# Patient Record
Sex: FEMALE | Race: WHITE | NOT HISPANIC OR LATINO | Employment: FULL TIME | URBAN - METROPOLITAN AREA
[De-identification: names, ages, dates, MRNs, and addresses within clinical notes are randomized per-mention and may not be internally consistent; named-entity substitution may affect disease eponyms.]

---

## 2020-07-30 ENCOUNTER — OFFICE VISIT (OUTPATIENT)
Dept: OBSTETRICS AND GYNECOLOGY | Facility: CLINIC | Age: 55
End: 2020-07-30
Payer: COMMERCIAL

## 2020-07-30 ENCOUNTER — TELEPHONE (OUTPATIENT)
Dept: OBSTETRICS AND GYNECOLOGY | Facility: CLINIC | Age: 55
End: 2020-07-30

## 2020-07-30 VITALS
BODY MASS INDEX: 28.25 KG/M2 | DIASTOLIC BLOOD PRESSURE: 84 MMHG | HEIGHT: 67 IN | WEIGHT: 180 LBS | SYSTOLIC BLOOD PRESSURE: 122 MMHG

## 2020-07-30 DIAGNOSIS — N89.8 VAGINAL IRRITATION: ICD-10-CM

## 2020-07-30 DIAGNOSIS — N89.8 VAGINAL DISCHARGE: Primary | ICD-10-CM

## 2020-07-30 DIAGNOSIS — B37.31 YEAST VAGINITIS: ICD-10-CM

## 2020-07-30 PROCEDURE — 99213 OFFICE O/P EST LOW 20 MIN: CPT | Mod: 25,S$GLB,, | Performed by: OBSTETRICS & GYNECOLOGY

## 2020-07-30 PROCEDURE — 99213 PR OFFICE/OUTPT VISIT, EST, LEVL III, 20-29 MIN: ICD-10-PCS | Mod: 25,S$GLB,, | Performed by: OBSTETRICS & GYNECOLOGY

## 2020-07-30 PROCEDURE — 3008F PR BODY MASS INDEX (BMI) DOCUMENTED: ICD-10-PCS | Mod: CPTII,S$GLB,, | Performed by: OBSTETRICS & GYNECOLOGY

## 2020-07-30 PROCEDURE — 87210 PR  SMEAR,STAIN,WET MNT,INTERP: ICD-10-PCS | Mod: QW,S$GLB,, | Performed by: OBSTETRICS & GYNECOLOGY

## 2020-07-30 PROCEDURE — 3008F BODY MASS INDEX DOCD: CPT | Mod: CPTII,S$GLB,, | Performed by: OBSTETRICS & GYNECOLOGY

## 2020-07-30 PROCEDURE — 87210 SMEAR WET MOUNT SALINE/INK: CPT | Mod: QW,S$GLB,, | Performed by: OBSTETRICS & GYNECOLOGY

## 2020-07-30 RX ORDER — FLUTICASONE PROPIONATE 50 MCG
SPRAY, SUSPENSION (ML) NASAL
COMMUNITY
Start: 2020-06-09

## 2020-07-30 RX ORDER — FLUCONAZOLE 150 MG/1
150 TABLET ORAL EVERY OTHER DAY
Qty: 2 TABLET | Refills: 0 | Status: SHIPPED | OUTPATIENT
Start: 2020-07-30 | End: 2020-08-03

## 2020-07-30 RX ORDER — CLOTRIMAZOLE AND BETAMETHASONE DIPROPIONATE 10; .64 MG/G; MG/G
CREAM TOPICAL
Qty: 1 TUBE | Refills: 0 | Status: SHIPPED | OUTPATIENT
Start: 2020-07-30 | End: 2020-08-13

## 2020-07-30 RX ORDER — LISINOPRIL AND HYDROCHLOROTHIAZIDE 10; 12.5 MG/1; MG/1
TABLET ORAL
COMMUNITY
Start: 2020-07-17

## 2020-07-30 RX ORDER — PHENTERMINE HYDROCHLORIDE 37.5 MG/1
TABLET ORAL
COMMUNITY
Start: 2020-07-02

## 2020-07-30 RX ORDER — ATORVASTATIN CALCIUM 40 MG/1
TABLET, FILM COATED ORAL
COMMUNITY
Start: 2020-07-07

## 2020-07-30 NOTE — PATIENT INSTRUCTIONS
Preventing Vaginal Infection  These steps can help you stay comfortable during treatment of a vaginal infection. They also help prevent vaginal infections in the future.  Keeping a healthy balance  Factors that change the normal balance in the vagina can lead to a vaginal infection. To help keep the balance normal, try these tips:  · Change out of wet bathing suits and damp exercise clothes as soon as possible. Yeast thrive in a warm, moist environment.  · Avoid wearing tight pants. Choose cotton underwear and pantyhose that have a cotton crotch. Cotton keeps you cooler and drier than synthetics.  · Don't douche unless directed by your health care provider. Douching can destroy friendly bacteria and change the vagina's normal balance.  · Wipe from front to back after using the toilet. This prevents bacteria from spreading from the anus to the vulva.  · Wash the vulva with mild, unscented soap or with plain water.  · Wash your diaphragm, spermicide applicators, and sex toys with mild soap and water after use. Dry them thoroughly before putting them away.  · Change tampons often (every 2 hours to 4 hours). Leaving a tampon in for too long may disrupt the balance of vaginal bacteria.  · Avoid vaginal sprays, scented toilet paper and soaps, and deodorant tampons or pads, which can cause vaginal irritation  Staying healthy overall  Good overall health can help you resist infection. To be healthier:  · Help protect yourself from STDs by using latex condoms for intercourse. Ask your health care provider for more information about safer sex.  · Eat a variety of healthy foods.  · Exercise regularly.  · Get enough rest and sleep.  · Maintain a healthy weight. If you need to lose weight, ask your health care provider for advice on how to start.  Date Last Reviewed: 5/18/2015  © 9585-3682 [employer]. [address] [place] [address], [place] [address]. All rights reserved. This information is not intended as a substitute  for professional medical care. Always follow your healthcare professional's instructions.

## 2020-07-30 NOTE — PROGRESS NOTES
Vaginal itching and discomfort for the past couple of days. Slight discharge also. States she has been working outside and exercising more.

## 2020-07-30 NOTE — TELEPHONE ENCOUNTER
----- Message from Pattie Petersen sent at 7/30/2020  9:18 AM CDT -----  Regarding: Pt Advice  Contact: Pt  Type:  Needs Medical Advice    Who Called: Savannah Kumar  Symptoms (please be specific): vaginal itching    How long has patient had these symptoms:  a couple of days  Pharmacy name and phone #:  [facility] in [place] [phone]  Would the patient rather a call back or a response via [facility]? Call back   Best Call Back Number: [phone]  Additional Information:

## 2020-07-30 NOTE — PROGRESS NOTES
"  Subjective:      Patient ID: Savannah Kumar is a 54 y.o. female who presents for evaluation today.    Chief Complaint:  Vaginal Itching      History of Present Illness:  Vaginal Discharge  Patient presents for evaluation of an abnormal vaginal discharge. Symptoms have been present for a few days. Vaginal symptoms: discharge described as clear, white and associated with vulvar itching. To date she reports OTC treatment modalities for symptom relief, which initially helped a few weeks ago but the symptoms have since returned. The patient denies any recent exposure to STD's. Contraception: status post hysterectomy. She denies abnormal bleeding, blisters, bumps, dyspareunia, lesions, odor, post coital bleeding, tears and warts. Sexually transmitted infection risk: very low risk of STD exposure. Patient also admits to recently working out more often and working outside in her yard causing her to sweat frequently.     VITALS  BP Readings from Last 1 Encounters:   07/30/20 122/84   Weight: 81.6 kg (180 lb)   Height: 5' 7" (170.2 cm)   BMI Readings from Last 1 Encounters:   07/30/20 28.19 kg/m²     No LMP recorded. Patient has had a hysterectomy.     MEDICATIONS  No outpatient medications have been marked as taking for the 7/30/20 encounter (Office Visit) with Amanda Dominguez NP.     Review of Systems   Review of Systems   Constitutional: Negative for activity change, appetite change, chills, diaphoresis, fatigue, fever and unexpected weight change.   HENT: Negative for mouth sores.    Eyes: Negative for visual disturbance.   Respiratory: Negative for cough and shortness of breath.    Cardiovascular: Negative for chest pain, palpitations and leg swelling.   Gastrointestinal: Negative for abdominal pain, bloating, blood in stool, constipation, diarrhea and nausea.   Endocrine: Negative for hair loss and hot flashes.   Genitourinary: Positive for vaginal discharge. Negative for dysmenorrhea, dysuria, menstrual problem, " vaginal pain, urinary incontinence, vaginal dryness and vaginal odor.        Vaginal itching   Musculoskeletal: Negative for back pain and leg pain.   Integumentary:  Negative for rash, acne, hair changes, mole/lesion, breast mass, nipple discharge and breast skin changes.   Neurological: Negative for headaches.   Hematological: Negative for adenopathy. Does not bruise/bleed easily.   Psychiatric/Behavioral: Negative for depression and sleep disturbance. The patient is not nervous/anxious.    Breast: Negative for asymmetry, mass, mastodynia, nipple discharge and skin changes       Objective:    Physical Exam:   Constitutional: She is oriented to person, place, and time. She appears well-developed and well-nourished. No distress.    HENT:   Head: Normocephalic and atraumatic.   Right Ear: External ear normal.   Left Ear: External ear normal.     Neck: Normal range of motion. Neck supple. No tracheal deviation present. No thyromegaly present.     Pulmonary/Chest: Effort normal. No respiratory distress. She exhibits no tenderness.        Abdominal: Soft. She exhibits no distension and no mass. There is no abdominal tenderness. There is no rebound and no guarding. No hernia. Hernia confirmed negative in the right inguinal area and confirmed negative in the left inguinal area.     Genitourinary:    Rectum normal.      Pelvic exam was performed with patient supine.   There is no rash, tenderness, lesion or injury on the right labia. There is no rash, tenderness, lesion or injury on the left labia. Right adnexum displays no mass, no tenderness and no fullness. Left adnexum displays no mass, no tenderness and no fullness. There is erythema in the vagina. Labial bartholins normal.positive for vaginal discharge (white, curd-like)          Musculoskeletal: Normal range of motion and moves all extremeties.       Neurological: She is alert and oriented to person, place, and time.    Skin: Skin is warm and dry. No rash noted. She  is not diaphoretic. No erythema. No pallor.    Psychiatric: She has a normal mood and affect. Her behavior is normal. Judgment and thought content normal.          Assessment:      1. Vaginal discharge    2. Yeast vaginitis    3. Vaginal irritation         Plan:   Wet mount obtained in office today displaying budding yeast and hyphae. Discussed treatment regimen with patient. Discussed symptoms reported being consistent with those of yeast vaginitis. Patient instructed regarding the following preventive measures:  · Keep the vaginal area clean and dry.   · Warm moist environments are a good breeding ground for yeast.   · Shower promptly after exercising, yard work, etc.   · Wear cotton underwear and loose fitting clothes on the bottom.   · If you swim, try not to stay in a wet bathing suit too long.   · Decreasing carbs and sugar in the diet can also prevent yeast infections.    Patient instructed to contact the clinic if her symptoms worsen or do not improve. Patient is happy with the plan of care at this time, verbalizes understanding and denies outstanding questions.